# Patient Record
(demographics unavailable — no encounter records)

---

## 2024-11-06 NOTE — ASSESSMENT
[FreeTextEntry1] : 35 y/o f ref by Dr. Cheyenne Monge due to abnormalities in liver enzymes and the presence of a benign lesion in the liver observed on recent MRIs. No imaging available. Labs reported by pt. Following issues were d/w pt in detail  Risk factors for liver disease- metabolic syndrome now s/p wt loss, exposure to herbal supplement vs AIH  Patient is aware that they have several components of the metabolic syndrome including weight gain during adulthood. The benefit of a low glycemic diet and exercise were discussed. The patient needs improved treatment of diabetes, HTN, elevated cholesterol as appropriate. Long term sequelae of Fatty liver disease including progression to decompensated cirrhosis and hcc explained to pt.  The utility of nutrition consult explained. The role of liver biopsy also discussed.  Patient demonstrated understanding. Will order fibroscan, Factors associated with unreliable results included BMI >30 kg/m2, age >52 years, female sex,  inexperience, and type 2 diabetes mellitus discussed. MRE is also a consideration after fibroscan Will also order chronic liver disease w/u and other imaging as needed  - Discontinue Lina Ramos's Hair Growth vitamin syrup  - Bloodwork at [facility] for comprehensive liver function tests   - Undergo FibroScan to quantify any liver scar tissue  - Collect MRI disc from [facility] for further review  - Discontinue alcohol consumption temporarily until liver function can be ascertained  f/u after above w/u I spent total of 50 minutes on this patient encounter.

## 2024-11-06 NOTE — REVIEW OF SYSTEMS
Continue Regimen: prednisone 20 mg tablet \\nSig: Take 2 tabs PO daily\\n\\nCellCept 500 mg tablet \\nSig: Take 2 tablets PO every morning and 2 tablets PO in the evening\\n\\nDapsone 100mg daily \\n\\nComplete ciprofloxacin dosing
Detail Level: Simple
[Negative] : Gastrointestinal

## 2024-11-06 NOTE — HISTORY OF PRESENT ILLNESS
[de-identified] : TELEVISIT ATTESTATATION  Telemedicine Video Visit Attestation Patient location: home Physician location: [address] [place] [address] Date: 11/6/24 Other Participants Present-   I obtained patient consent and agreement for this video encounter.  Additionally, I reviewed with the patient and addressed all questions regarding their disposition plan and follow-up instructions including any pertinent findings. The patient has acknowledged understanding of this information. I informed the patient a copy of their after-visit summary for this visit is available for them to refer to within the [facility] my chart rosanna  37 y/o f ref by Dr. Cheyenne Monge due to abnormalities in liver enzymes and the presence of a benign lesion in the liver observed on recent MRIs.She has had two MRIs, and the most recent one indicated that further follow-up was not necessary given R lobe 1.8cm hemangioma. However, her blood work showed elevated liver enzymes while on weight loss medications,AP nl, ASt 56, ALT 33; nl Tbili.  Semaglutide and Terzepatide. She also reported itchy skin. Initial concerns regarding liver were raised approximately one and a half years ago following the birth of her daughter. Patient confirms having previously taken Sertraline for severe anxiety, and she recently added Lina Ramos's Hair Growth vitamin syrup to her regimen. She denies any drug use but has a history of smoking for 10 years, quitting in 2017. Has lost 30lbs on GLP1a and then stopped last week; now has pruritus. No other sx of liver disease. - Past Medical History : Suffered from postpartum depression, on Sertraline for anxiety, high cholesterol, high body weight. - Past Surgical History : Had adenoids and tonsils removed. - Family History : - High prevalence of cancer in the family. No known family history of liver disease.  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 Has two young children. Quit smoking in 2017. Drinks alcohol occasionally, usually one drink per week and three or four once a month.  - Review of Systems : - General : Weight decreased from 190 lb to 160 lb due to use of weight loss medications. - Neurological : Has been taking Sertraline for severe anxiety. The dose was increased thist summer. - Gastrointestinal : Elevated liver enzymes and benign liver lesion on MRI - Integumentary : Complaints of body-wide itching, more prominent since starting weight loss medication. - Medications : Semaglutide and Terzepatide for weight loss (discontinued), Sertraline for Anxiety (75mg per day), Lina Ramos's Hair Growth vitamin syrup - Allergies : None noted

## 2024-11-06 NOTE — PHYSICAL EXAM
[General Appearance - Alert] : alert [Sclera] : the sclera and conjunctiva were normal [Neck Appearance] : the appearance of the neck was normal [] : no respiratory distress [Heart Rate And Rhythm] : heart rate was normal and rhythm regular [Edema] : there was no peripheral edema [Bowel Sounds] : normal bowel sounds [Abdomen Tenderness] : non-tender [Oriented To Time, Place, And Person] : oriented to person, place, and time